# Patient Record
Sex: FEMALE | Race: WHITE
[De-identification: names, ages, dates, MRNs, and addresses within clinical notes are randomized per-mention and may not be internally consistent; named-entity substitution may affect disease eponyms.]

---

## 2017-02-14 ENCOUNTER — HOSPITAL ENCOUNTER (EMERGENCY)
Dept: HOSPITAL 26 - MED | Age: 37
Discharge: HOME | End: 2017-02-14
Payer: MEDICAID

## 2017-02-14 VITALS — WEIGHT: 240 LBS | HEIGHT: 67 IN | BODY MASS INDEX: 37.67 KG/M2

## 2017-02-14 VITALS — SYSTOLIC BLOOD PRESSURE: 124 MMHG | DIASTOLIC BLOOD PRESSURE: 64 MMHG

## 2017-02-14 VITALS — DIASTOLIC BLOOD PRESSURE: 68 MMHG | SYSTOLIC BLOOD PRESSURE: 127 MMHG

## 2017-02-14 DIAGNOSIS — R03.0: ICD-10-CM

## 2017-02-14 DIAGNOSIS — B37.3: Primary | ICD-10-CM

## 2017-02-14 DIAGNOSIS — Z88.5: ICD-10-CM

## 2017-02-14 DIAGNOSIS — R51: ICD-10-CM

## 2017-02-14 NOTE — NUR
-------------------------------------------------------------------------------

           *** Note undone in EDM - 02/14/17 at 0346 by ODALIS ***            

-------------------------------------------------------------------------------

PATIENT ELOPED FROM FACILITY. DISCHARGE INSTRUCTIONS NOT GIVEN TO PATIENT. DR. SELBY NOTIFIED.

## 2017-02-14 NOTE — NUR
TRIED TO CALL THE PHONE NUMBER ON THE PATIENT'S FACESHEET BUT IT DIRECTED ME 
RIGHT AWAY TO THE VOICEMAIL BOX. I LEFT A MESSAGE NOTIFYING THAT SHE HAS A 
PRESCRITPTION AND IF SHE CAN COME AND GET IT. CHARGE NURSE AWARE.

## 2017-02-14 NOTE — NUR
PATIENT PRESENTS TO ED WITH LEFT HEAD PRESSURE AND LEFT EAR PAIN THAT RADIATES 
TO NECK. DENIES N/V/D; SKIN IS PINK/WARM/DRY; AAOX4 WITH EVEN AND STEADY GAIT; 
LUNGS CLEAR BL; HR EVEN AND REGULAR; PT DENIES ANY FEVER, CP, SOB, OR COUGH AT 
THIS TIME; PATIENT STATES PAIN OF 10/10 AT THIS TIME; VSS; PATIENT POSITIONED 
FOR COMFORT; HOB ELEVATED; BEDRAILS UP X2; BED DOWN. ER MD MADE AWARE OF PT 
STATUS.

## 2017-02-14 NOTE — NUR
ATTEMPT TO CALL PT WITH PHONE NUMBER FROM [employer] [phone]. CALLED TWICE 
NO ANSWER.

## 2017-06-24 ENCOUNTER — HOSPITAL ENCOUNTER (INPATIENT)
Dept: HOSPITAL 26 - MED | Age: 37
LOS: 2 days | Discharge: HOME | DRG: 812 | End: 2017-06-26
Attending: FAMILY MEDICINE | Admitting: FAMILY MEDICINE
Payer: MEDICAID

## 2017-06-24 VITALS — WEIGHT: 240 LBS | HEIGHT: 69 IN | BODY MASS INDEX: 35.55 KG/M2

## 2017-06-24 VITALS — DIASTOLIC BLOOD PRESSURE: 84 MMHG | SYSTOLIC BLOOD PRESSURE: 137 MMHG

## 2017-06-24 VITALS — DIASTOLIC BLOOD PRESSURE: 51 MMHG | SYSTOLIC BLOOD PRESSURE: 102 MMHG

## 2017-06-24 VITALS — DIASTOLIC BLOOD PRESSURE: 79 MMHG | SYSTOLIC BLOOD PRESSURE: 133 MMHG

## 2017-06-24 VITALS — DIASTOLIC BLOOD PRESSURE: 58 MMHG | SYSTOLIC BLOOD PRESSURE: 103 MMHG

## 2017-06-24 DIAGNOSIS — Z88.5: ICD-10-CM

## 2017-06-24 DIAGNOSIS — G92: ICD-10-CM

## 2017-06-24 DIAGNOSIS — E11.9: ICD-10-CM

## 2017-06-24 DIAGNOSIS — D64.9: ICD-10-CM

## 2017-06-24 DIAGNOSIS — E05.80: ICD-10-CM

## 2017-06-24 DIAGNOSIS — F15.10: ICD-10-CM

## 2017-06-24 DIAGNOSIS — R74.0: ICD-10-CM

## 2017-06-24 DIAGNOSIS — F25.0: ICD-10-CM

## 2017-06-24 DIAGNOSIS — T43.622A: Primary | ICD-10-CM

## 2017-06-24 DIAGNOSIS — F17.210: ICD-10-CM

## 2017-06-24 DIAGNOSIS — Z56.0: ICD-10-CM
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
## 2017-06-24 DIAGNOSIS — E87.6: ICD-10-CM

## 2017-06-24 DIAGNOSIS — N39.0: ICD-10-CM

## 2017-06-24 DIAGNOSIS — Z72.89: ICD-10-CM

## 2017-06-24 DIAGNOSIS — N17.0: ICD-10-CM

## 2017-06-24 LAB
ALBUMIN FLD-MCNC: 3.7 G/DL (ref 3.4–5)
ALP SERPL-CCNC: 73 U/L (ref 46–116)
ALT SERPL-CCNC: 100 U/L (ref 12–78)
AMPHET UR-MCNC: (no result) NG/ML
AMYLASE SERPL-CCNC: 27 U/L (ref 25–115)
ANION GAP SERPL CALCULATED.3IONS-SCNC: 13.9 MMOL/L (ref 8–16)
APPEARANCE UR: CLEAR
APTT PPP: 25.9 SECS (ref 22–35.6)
AST SERPL-CCNC: 97 U/L (ref 15–37)
BACTERIA URNS QL MICRO: (no result) /HPF
BARBITURATES UR QL SCN: (no result) NG/ML
BASOPHILS # BLD AUTO: 0.2 K/UL (ref 0–0.22)
BASOPHILS NFR BLD AUTO: 1.2 % (ref 0–2)
BENZODIAZ UR QL SCN: (no result) NG/ML
BILIRUB DIRECT SERPL-MCNC: 0.2 MG/DL (ref 0–0.3)
BILIRUB SERPL-MCNC: 0.6 MG/DL (ref 0–1)
BILIRUB UR QL CFM: NEGATIVE
BILIRUB UR QL STRIP: (no result)
BUN SERPL-MCNC: 16 MG/DL (ref 7–18)
BZE UR QL SCN: (no result) NG/ML
CALCIUM SPEC-MCNC: 8.1 MG/DL (ref 8.5–10.1)
CANNABINOIDS UR QL SCN: (no result) NG/ML
CHLORIDE SERPL-SCNC: 103 MMOL/L (ref 98–107)
CO2 SERPL-SCNC: 25 MMOL/L (ref 21–32)
COLOR UR: YELLOW
CREAT SERPL-MCNC: 0.9 MG/DL (ref 0.6–1.3)
EOSINOPHIL # BLD AUTO: 0.4 K/UL (ref 0–0.4)
EOSINOPHIL NFR BLD AUTO: 3 % (ref 0–4)
ERYTHROCYTE [DISTWIDTH] IN BLOOD BY AUTOMATED COUNT: 13.5 % (ref 11.6–13.7)
ETHANOL SERPL-MCNC: < 3 MG/DL (ref ?–3)
GFR SERPL CREATININE-BSD FRML MDRD: 91 ML/MIN (ref 90–?)
GLUCOSE SERPL-MCNC: 99 MG/DL (ref 74–106)
GLUCOSE UR STRIP-MCNC: NEGATIVE MG/DL
HCT VFR BLD AUTO: 34.2 % (ref 36–48)
HGB BLD-MCNC: 11.7 G/DL (ref 12–16)
HGB UR QL STRIP: (no result)
INR PPP: 1.1 (ref 0.8–1.2)
LEUKOCYTE ESTERASE UR QL STRIP: NEGATIVE
LIPASE SERPL-CCNC: 115 U/L (ref 73–393)
LYMPHOCYTES # BLD AUTO: 1.8 K/UL (ref 2.5–16.5)
LYMPHOCYTES NFR BLD AUTO: 13.8 % (ref 20.5–51.1)
MAGNESIUM SERPL-MCNC: 2.1 MG/DL (ref 1.8–2.4)
MCH RBC QN AUTO: 28 PG (ref 27–31)
MCHC RBC AUTO-ENTMCNC: 34 G/DL (ref 33–37)
MCV RBC AUTO: 83 FL (ref 80–94)
MONOCYTES # BLD AUTO: 0.8 K/UL (ref 0.8–1)
MONOCYTES NFR BLD AUTO: 5.8 % (ref 1.7–9.3)
MUCOUS THREADS URNS QL MICRO: (no result) /LPF
NEUTROPHILS # BLD AUTO: 9.9 K/UL (ref 1.8–7.7)
NEUTROPHILS NFR BLD AUTO: 76.2 % (ref 42.2–75.2)
NITRITE UR QL STRIP: NEGATIVE
OPIATES UR QL SCN: (no result) NG/ML
PCP UR QL SCN: (no result) NG/ML
PH UR STRIP: 6 [PH] (ref 5–9)
PHOSPHATE SERPL-MCNC: 2.7 MG/DL (ref 2.5–4.9)
PLATELET # BLD AUTO: 255 K/UL (ref 140–450)
POTASSIUM SERPL-SCNC: 2.9 MMOL/L (ref 3.5–5.1)
PROT SERPL-MCNC: 8.6 G/DL (ref 6.4–8.2)
PROT UR QL STRIP: (no result)
PROTHROMBIN TIME: 11.3 SECS (ref 10.8–13.4)
RBC # BLD AUTO: 4.13 MIL/UL (ref 4.2–5.4)
RBC #/AREA URNS HPF: (no result) /HPF (ref 0–5)
SODIUM SERPL-SCNC: 139 MMOL/L (ref 136–145)
SP GR UR STRIP: 1.02 (ref 1–1.03)
SQUAMOUS URNS QL MICRO: (no result) /LPF
T4 FREE SERPL-MCNC: 1.6 NG/DL (ref 0.76–1.46)
TSH SERPL DL<=0.05 MIU/L-ACNC: 1.36 UIU/ML (ref 0.34–3.76)
UROBILINOGEN UR STRIP-MCNC: 0.2 EU/DL (ref 0.2–1)
WBC # BLD AUTO: 13.1 K/UL (ref 4.8–10.8)
WBC,URINE: (no result) /HPF (ref 0–5)

## 2017-06-24 PROCEDURE — G0482 DRUG TEST DEF 15-21 CLASSES: HCPCS

## 2017-06-24 PROCEDURE — C1758 CATHETER, URETERAL: HCPCS

## 2017-06-24 RX ADMIN — SODIUM CHLORIDE SCH MLS/HR: 9 INJECTION, SOLUTION INTRAVENOUS at 16:19

## 2017-06-24 SDOH — ECONOMIC STABILITY - INCOME SECURITY: UNEMPLOYMENT, UNSPECIFIED: Z56.0

## 2017-06-24 NOTE — NUR
ON LOWER EXTREMITIES HAS RED SPOTS AND ITCHING , PT SCRATCH IT ALL THE TIME DR JOHNSON INFORM 
.ORDER RECEIVED.

## 2017-06-24 NOTE — NUR
PT RESTING ON BED;CHECKED FOR CIRCULATION ON UPPER AND LOWER EXTREMITIES;NO 
COMPROMISE CIRCULATION NOTED;NO BRUISE NOTED;WILL CONTIUE TO MONITOR PT.

## 2017-06-24 NOTE — NUR
PT RESTING ON BED;CHECKED RESTRAINTS;CHECKED UPPER AND LOWER EXTREMITIES;NO 
COMPROMISE CIRCULATION NOTED;SKIN IS INTACT.

## 2017-06-24 NOTE — NUR
NOTIFIED BERNARDO PADILLA RN THAT SCHEDULED 2100 SEROQUEL 25MG PO IS NOT AVAILABLE IN ICU. CHARGE 
NURSE VALENCIA ORTIZ MADE AWARE. CONTINUE TO MONITOR PATIENT.

## 2017-06-24 NOTE — NUR
SEEN BY DR. JOHNSON , HE EXAM PT, AT BED SIDE . HE AWARE THAT PT, DID'T RECEIVE ROCEPINE FROM 
ER.. HE WILL ODER AFTER COMPLETED PT. EVALUATION.

## 2017-06-24 NOTE — NUR
RESTRAINT REMOVED;PT IS CALM AND RESTING ON BED QUIETLY;W/GOOD CIRCULATION ON 
UPPER AND LOWER EXTREMITIES;SKIN INTACT;PT ABLE TO MOVE ALL EXTREMITIES;WILL 
CONTOINUE TO MONITOR PT.

## 2017-06-24 NOTE — NUR
SEEN BY DR. MCCOY AT BED SIDE, KEEP PT. ON 5150 HOLD, SHE TRY TO GET INTOUCH WITH HER 
FAMILY FOR MORE INFORMATION.

## 2017-06-24 NOTE — NUR
TOLERATED DUE MEDICATION. NO SIGNS OF DISTRESS OR SOB NOTED. PATIENT'S NEEDS MET AT THIS 
TIME. CONTINUE TO MONITOR PATIENT.

## 2017-06-24 NOTE — NUR
RECEIVED REPORT FROM TRACI OTRIZ. PATIENT IS CURRENTLY RESTING IN BED, BUT CAN BE AROUSED BY 
NAME. OPENS EYES SPONTANEOUSLY, ABLE TO FOLLOW COMMANDS, AND MAKES NEEDS KNOWN. PATIENT IS 
AFEBRILE AND VITAL SIGNS ARE STABLE. NSR ON CARDIAC MONITOR. BOWEL SOUNDS ARE ACTIVE. THERE 
IS A #22 IN THE RIGHT AC RECEIVING NORMAL SALINE  ML/HR. SITE IS DRY, INTACT, AND 
ASYMPTOMATIC. SCDS ARE IN PLACE FOR VTE PROPHYLAXIS. EXPLAINED PLAN OF CARE TONIGHT TO 
INCLUDE MEDICATION ADMINISTRATION, VITALS, AND CARDIAC MONITORING. PATIENT VERBALIZED 
UNDERSTANDING. PATIENT'S NEEDS MET AT THIS TIME. HOB AT 30 DEGREES WITH BED IN LOW POSITION. 
WILL CONTINUE TO MONITOR PATIENT. 

-------------------------------------------------------------------------------

Addendum: 06/24/17 at 2255 by Rahul Ordoñez RN

-------------------------------------------------------------------------------

PATIENT IS TELEMETRY STATUS.

## 2017-06-24 NOTE — NUR
RECEIVED FROM ER VIA [facility]. SHE AWAKE AND ALERT ORIENTED X3 . SHE IS UNABLE TO IDENTIFY 
THE DAY AND DATE. ABLE TO WALK FROM [facility] TO BED. SHE IS ON ROOM AIR O2 98% . SKIN DRY AND 
WARM TO TOUCH COLOR IS DARK CLARK ,BREATH SOUND CLEAR  ABDOMEN SOFT B/S ACTIVE.

## 2017-06-24 NOTE — NUR
Patient will be admitted to care of DR JOSE. Admited to ICU.  Will go to room 
3. Belongings list completed.  Report to DIANA BANEGAS.

## 2017-06-24 NOTE — NUR
1021--Patient was BIBA and taken to bed 03 via gurney per EMS. [employer] at 
bedside; [employer] at bedside.

## 2017-06-25 VITALS — SYSTOLIC BLOOD PRESSURE: 107 MMHG | DIASTOLIC BLOOD PRESSURE: 60 MMHG

## 2017-06-25 VITALS — DIASTOLIC BLOOD PRESSURE: 68 MMHG | SYSTOLIC BLOOD PRESSURE: 120 MMHG

## 2017-06-25 VITALS — DIASTOLIC BLOOD PRESSURE: 62 MMHG | SYSTOLIC BLOOD PRESSURE: 109 MMHG

## 2017-06-25 VITALS — DIASTOLIC BLOOD PRESSURE: 53 MMHG | SYSTOLIC BLOOD PRESSURE: 115 MMHG

## 2017-06-25 VITALS — SYSTOLIC BLOOD PRESSURE: 106 MMHG | DIASTOLIC BLOOD PRESSURE: 69 MMHG

## 2017-06-25 VITALS — DIASTOLIC BLOOD PRESSURE: 78 MMHG | SYSTOLIC BLOOD PRESSURE: 121 MMHG

## 2017-06-25 LAB
ANION GAP SERPL CALCULATED.3IONS-SCNC: 10.7 MMOL/L (ref 8–16)
BASOPHILS # BLD AUTO: 0.2 K/UL (ref 0–0.22)
BASOPHILS NFR BLD AUTO: 1.5 % (ref 0–2)
BUN SERPL-MCNC: 7 MG/DL (ref 7–18)
CALCIUM SPEC-MCNC: 7.4 MG/DL (ref 8.5–10.1)
CHLORIDE SERPL-SCNC: 107 MMOL/L (ref 98–107)
CHOLEST SERPL-MCNC: 95 MG/DL (ref ?–200)
CHOLEST/HDLC SERPL: 3.3 {RATIO} (ref 1–4.5)
CO2 SERPL-SCNC: 26.2 MMOL/L (ref 21–32)
CREAT SERPL-MCNC: 0.7 MG/DL (ref 0.6–1.3)
EOSINOPHIL # BLD AUTO: 0.4 K/UL (ref 0–0.4)
EOSINOPHIL NFR BLD AUTO: 3.7 % (ref 0–4)
ERYTHROCYTE [DISTWIDTH] IN BLOOD BY AUTOMATED COUNT: 13.6 % (ref 11.6–13.7)
GFR SERPL CREATININE-BSD FRML MDRD: 122 ML/MIN (ref 90–?)
GLUCOSE SERPL-MCNC: 90 MG/DL (ref 74–106)
HCT VFR BLD AUTO: 33.1 % (ref 36–48)
HDLC SERPL-MCNC: 29 MG/DL (ref 40–60)
HGB BLD-MCNC: 11.1 G/DL (ref 12–16)
LDLC SERPL CALC-MCNC: 57 MG/DL (ref 60–100)
LYMPHOCYTES # BLD AUTO: 1.9 K/UL (ref 2.5–16.5)
LYMPHOCYTES NFR BLD AUTO: 19 % (ref 20.5–51.1)
MAGNESIUM SERPL-MCNC: 2 MG/DL (ref 1.8–2.4)
MCH RBC QN AUTO: 28 PG (ref 27–31)
MCHC RBC AUTO-ENTMCNC: 34 G/DL (ref 33–37)
MCV RBC AUTO: 83 FL (ref 80–94)
MONOCYTES # BLD AUTO: 0.5 K/UL (ref 0.8–1)
MONOCYTES NFR BLD AUTO: 5.2 % (ref 1.7–9.3)
NEUTROPHILS # BLD AUTO: 7 K/UL (ref 1.8–7.7)
NEUTROPHILS NFR BLD AUTO: 70.6 % (ref 42.2–75.2)
PHOSPHATE SERPL-MCNC: 2.7 MG/DL (ref 2.5–4.9)
PLATELET # BLD AUTO: 231 K/UL (ref 140–450)
POTASSIUM SERPL-SCNC: 2.9 MMOL/L (ref 3.5–5.1)
RBC # BLD AUTO: 3.99 MIL/UL (ref 4.2–5.4)
SODIUM SERPL-SCNC: 141 MMOL/L (ref 136–145)
TRIGL SERPL-MCNC: 48 MG/DL (ref 30–150)
WBC # BLD AUTO: 10 K/UL (ref 4.8–10.8)

## 2017-06-25 RX ADMIN — DEXTROSE SCH MLS/HR: 5 SOLUTION INTRAVENOUS at 21:00

## 2017-06-25 RX ADMIN — SODIUM CHLORIDE SCH MLS/HR: 9 INJECTION, SOLUTION INTRAVENOUS at 20:07

## 2017-06-25 RX ADMIN — DEXTROSE SCH MLS/HR: 5 SOLUTION INTRAVENOUS at 11:17

## 2017-06-25 RX ADMIN — SODIUM CHLORIDE SCH MLS/HR: 9 INJECTION, SOLUTION INTRAVENOUS at 00:07

## 2017-06-25 RX ADMIN — SODIUM CHLORIDE SCH MLS/HR: 9 INJECTION, SOLUTION INTRAVENOUS at 11:16

## 2017-06-25 NOTE — NUR
PATIENT RESTING COMFORTABLY IN BED WITH NO ACUTE DISTRESS OR SOB NOTED. WILL CONTINUE TO 
MONITOR PATIENT.

## 2017-06-25 NOTE — NUR
RECEIVED PT FROM FRITZ RN PT AAOX4  SLEEPY ON TELEMETRY SR HL ON RT WRIST, HL ON RT AC AND IV 
FLUID ON LEFT FA SITTER AT BED SIDE INITIAL ASSESSMENT DONE

## 2017-06-25 NOTE — NUR
PT TAKEN  HER MEDIC WELL  RESTING ON BED; WAS CLAFIFY WITH CHARGE NURSE AND SUPERVISOR  THAT 
 DR MCINTOSH SAID THAT PT DOES NOT MEET 5150 AND CAN BE DISCHARGE WHEN MEDICAL CLEAR

## 2017-06-25 NOTE — NUR
OOB to bedside commode. While out of bed, dinner served. Patient will transfer to telemetry  
when bed is available. Dr. FullerEspinal in to interview patient. Dr. Bland informed that she want 
to release the 5150, but Dr. Bland want to keep her on for one more day. Patient is aware of 
plan of care.

## 2017-06-25 NOTE — NUR
RECEIVE PATIENT SLEEPING. EASILY AROUSABLE TO HER NAME. ALERT AND ORIENTED TO NAME PLACE AND 
TIME. SHE OFFERS NOT C/O DISCOMFORT AT THIS TIME. CARDIAC MONITORING SHOWS SINUS RHYTHM. 
PERIPHARAL  PLUSE PALPABLE. NO S/S OF EDEMA

BREAKFAST TRY SET UP.

ABNORMAL LAB , LEVAR, LEVEL 2.9. DR. LIMA IS ROUNDING AT THIS TIME AND INFORMED OF 
ABNORMAL LABS. ORDERS TO GIVE K-RIDER.

## 2017-06-25 NOTE — NUR
PATIENT IS RESTING COMFORTABLY IN BED. BREATHING IS EVEN AND UNLABORED. CONTINUE TO MONITOR 
PATIENT.

## 2017-06-26 VITALS — DIASTOLIC BLOOD PRESSURE: 55 MMHG | SYSTOLIC BLOOD PRESSURE: 106 MMHG

## 2017-06-26 VITALS — SYSTOLIC BLOOD PRESSURE: 106 MMHG | DIASTOLIC BLOOD PRESSURE: 58 MMHG

## 2017-06-26 VITALS — SYSTOLIC BLOOD PRESSURE: 100 MMHG | DIASTOLIC BLOOD PRESSURE: 56 MMHG

## 2017-06-26 VITALS — SYSTOLIC BLOOD PRESSURE: 94 MMHG | DIASTOLIC BLOOD PRESSURE: 51 MMHG

## 2017-06-26 VITALS — DIASTOLIC BLOOD PRESSURE: 53 MMHG | SYSTOLIC BLOOD PRESSURE: 116 MMHG

## 2017-06-26 LAB
ANION GAP SERPL CALCULATED.3IONS-SCNC: 10.3 MMOL/L (ref 8–16)
BASOPHILS # BLD AUTO: 0.2 K/UL (ref 0–0.22)
BASOPHILS NFR BLD AUTO: 2 % (ref 0–2)
BUN SERPL-MCNC: 10 MG/DL (ref 7–18)
CALCIUM SPEC-MCNC: 7.5 MG/DL (ref 8.5–10.1)
CHLORIDE SERPL-SCNC: 108 MMOL/L (ref 98–107)
CO2 SERPL-SCNC: 26.8 MMOL/L (ref 21–32)
CREAT SERPL-MCNC: 0.7 MG/DL (ref 0.6–1.3)
EOSINOPHIL # BLD AUTO: 0.3 K/UL (ref 0–0.4)
EOSINOPHIL NFR BLD AUTO: 3.7 % (ref 0–4)
ERYTHROCYTE [DISTWIDTH] IN BLOOD BY AUTOMATED COUNT: 13.6 % (ref 11.6–13.7)
GFR SERPL CREATININE-BSD FRML MDRD: 122 ML/MIN (ref 90–?)
GLUCOSE SERPL-MCNC: 107 MG/DL (ref 74–106)
HCT VFR BLD AUTO: 33.1 % (ref 36–48)
HGB BLD-MCNC: 10.8 G/DL (ref 12–16)
LYMPHOCYTES # BLD AUTO: 2.4 K/UL (ref 2.5–16.5)
LYMPHOCYTES NFR BLD AUTO: 27 % (ref 20.5–51.1)
MCH RBC QN AUTO: 27 PG (ref 27–31)
MCHC RBC AUTO-ENTMCNC: 33 G/DL (ref 33–37)
MCV RBC AUTO: 84 FL (ref 80–94)
MONOCYTES # BLD AUTO: 0.7 K/UL (ref 0.8–1)
MONOCYTES NFR BLD AUTO: 8.3 % (ref 1.7–9.3)
NEUTROPHILS # BLD AUTO: 5.2 K/UL (ref 1.8–7.7)
NEUTROPHILS NFR BLD AUTO: 59 % (ref 42.2–75.2)
PLATELET # BLD AUTO: 218 K/UL (ref 140–450)
POTASSIUM SERPL-SCNC: 3.1 MMOL/L (ref 3.5–5.1)
RBC # BLD AUTO: 3.95 MIL/UL (ref 4.2–5.4)
SODIUM SERPL-SCNC: 142 MMOL/L (ref 136–145)
WBC # BLD AUTO: 8.8 K/UL (ref 4.8–10.8)

## 2017-06-26 RX ADMIN — SODIUM CHLORIDE SCH MLS/HR: 9 INJECTION, SOLUTION INTRAVENOUS at 09:42

## 2017-06-26 NOTE — NUR
NOT DISTRESS NOTED ON TELE SR  , VOIDING WELL IV ON RT AC INFUSING WELLCOOPERATIVE TO FOLLOW 
DR MANZANARES

## 2017-06-26 NOTE — NUR
CM NOTE

AS PER  VIN, REVIEWS SHOULD BE SENT TO [facility] AND [facility]. FAXED INITIAL REVIEW TO [facility] [phone] AND TO [employer] 
[facility] [phone]

## 2017-06-26 NOTE — NUR
PT AWAKE AND ALERT, NO SIGNS OF ACUTE DISTRESS.  BOWEL SOUNDS ACTIVE IN ALL 4 QUADRANTS, 
BOWEL AND BLADDER CONTINENCE.  SKIN INTACT WITH RASH ON BUTTOCKS JUSTIN.  AMBULATORY WITH BRP.  
IV PATENT AND ASYMPTOMATIC.  PT DENIES PAIN AT THIS TIME.  RE-ORIENTED PATIENT TO UNIT AND 
HOSPITAL.  BED IN LOW POSITION WITH BILATERAL HALF SIDE RAILS UP, CALL LIGHT WITHIN REACH.

## 2017-06-26 NOTE — NUR
PATIENT HAS BEEN SCREENED AND CATEGORIZED AS LOW NUTRITION RISK. PATIENT WILL BE SEEN WITHIN 
7 DAYS OF ADMISSION.



7/1/17



ADEN CHEEMA RD

## 2017-06-26 NOTE — NUR
PT AWAKE AND ALERT, NO SIGNS OF ACUTE DISTRESS.  EDUCATED PATIENT ON DISCHARGE INSTRUCTIONS, 
INCLUDING FOLLOW UP WITH PCP WITHIN 3-5 DAYS, NEW PRESCRIPTION, SIGNS/SYMPTOMS OF WORSENING 
CONDITION AND/OR INFECTION, AND INFORMATION ON HOMELESS SHELTER AND CARE.  PT VERBALIZED 
UNDERSTANDING.  D/C'D IV AND TELE MONITOR, CUT OFF WRIST BANDS, GAVE PATIENT A BUS PASS.  
WALKED PT OUT TO FRONT LOBBY TO D/C VIA PUBLIC BUS.

## 2018-07-29 ENCOUNTER — HOSPITAL ENCOUNTER (EMERGENCY)
Dept: HOSPITAL 26 - MED | Age: 38
Discharge: HOME | End: 2018-07-29
Payer: MEDICAID

## 2018-07-29 VITALS — DIASTOLIC BLOOD PRESSURE: 69 MMHG | SYSTOLIC BLOOD PRESSURE: 104 MMHG

## 2018-07-29 VITALS — BODY MASS INDEX: 35.36 KG/M2 | WEIGHT: 220 LBS | HEIGHT: 66 IN

## 2018-07-29 VITALS — SYSTOLIC BLOOD PRESSURE: 121 MMHG | DIASTOLIC BLOOD PRESSURE: 69 MMHG

## 2018-07-29 DIAGNOSIS — Z88.5: ICD-10-CM
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
## 2018-07-29 DIAGNOSIS — Z91.09: ICD-10-CM
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
## 2018-07-29 DIAGNOSIS — S51.811A: Primary | ICD-10-CM

## 2018-07-29 PROCEDURE — 99283 EMERGENCY DEPT VISIT LOW MDM: CPT

## 2018-07-29 PROCEDURE — 12002 RPR S/N/AX/GEN/TRNK2.6-7.5CM: CPT

## 2018-07-29 PROCEDURE — 96372 THER/PROPH/DIAG INJ SC/IM: CPT

## 2018-08-14 ENCOUNTER — HOSPITAL ENCOUNTER (EMERGENCY)
Dept: HOSPITAL 26 - MED | Age: 38
Discharge: HOME | End: 2018-08-14
Payer: MEDICAID

## 2018-08-14 VITALS — SYSTOLIC BLOOD PRESSURE: 128 MMHG | DIASTOLIC BLOOD PRESSURE: 78 MMHG

## 2018-08-14 VITALS — DIASTOLIC BLOOD PRESSURE: 79 MMHG | SYSTOLIC BLOOD PRESSURE: 128 MMHG

## 2018-08-14 VITALS — HEIGHT: 66 IN | BODY MASS INDEX: 35.36 KG/M2 | WEIGHT: 220 LBS
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
## 2018-08-14 DIAGNOSIS — S51.811D: Primary | ICD-10-CM
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
## 2019-02-17 ENCOUNTER — HOSPITAL ENCOUNTER (EMERGENCY)
Dept: HOSPITAL 26 - MED | Age: 39
Discharge: HOME | End: 2019-02-17
Payer: MEDICAID

## 2019-02-17 VITALS — BODY MASS INDEX: 36.96 KG/M2 | HEIGHT: 67 IN | WEIGHT: 235.5 LBS

## 2019-02-17 VITALS — DIASTOLIC BLOOD PRESSURE: 79 MMHG | SYSTOLIC BLOOD PRESSURE: 120 MMHG

## 2019-02-17 VITALS — DIASTOLIC BLOOD PRESSURE: 77 MMHG | SYSTOLIC BLOOD PRESSURE: 119 MMHG

## 2019-02-17 DIAGNOSIS — Z88.5: ICD-10-CM

## 2019-02-17 DIAGNOSIS — N76.0: Primary | ICD-10-CM

## 2019-02-17 DIAGNOSIS — Z79.899: ICD-10-CM

## 2019-02-17 PROCEDURE — 87210 SMEAR WET MOUNT SALINE/INK: CPT

## 2019-02-17 PROCEDURE — 96372 THER/PROPH/DIAG INJ SC/IM: CPT

## 2019-02-17 PROCEDURE — 99283 EMERGENCY DEPT VISIT LOW MDM: CPT

## 2019-02-17 PROCEDURE — 36415 COLL VENOUS BLD VENIPUNCTURE: CPT

## 2019-02-17 PROCEDURE — 87491 CHLMYD TRACH DNA AMP PROBE: CPT

## 2019-02-17 PROCEDURE — 81025 URINE PREGNANCY TEST: CPT

## 2019-02-17 PROCEDURE — 81002 URINALYSIS NONAUTO W/O SCOPE: CPT

## 2019-02-17 NOTE — NUR
Patient discharged with v/s stable. Written and verbal after care instructions 
given and explained. Patient alert, oriented and verbalized understanding of 
instructions. Ambulatory with steady gait. All questions addressed prior to 
discharge. ID band removed. Patient advised to follow up with PMD. Rx of 
doxycycline given. Patient educated on indication of medication including 
possible reaction and side effects. Opportunity to ask questions provided and 
answered.

## 2019-02-17 NOTE — NUR
38Y/F BIB SELF WITH C/O NON RADIATING LOWER MID ABDOMINAL PAIN WITH PAINFUL 
URINATION X 1 WK. PER PT S/S SIMILAR WHEN SHE WAS TREATED WITH SYPHILIS LAST 
DECEMBER. LBM; YESTERDAY, - BLODDY STOOL, BS ACTIVE X 4 QUAD, SOFT AND TENDER. 
PT IS AAOX4, VSS AT THIS TIME, BED DOWN, LOW, LOCKED, BEDRAIL UP X 1, ER MD 
AWARE AND NOTIFIED OF PT STATUS. 



HX; DENIES

RX; DENIES

## 2019-03-24 ENCOUNTER — HOSPITAL ENCOUNTER (EMERGENCY)
Dept: HOSPITAL 26 - MED | Age: 39
Discharge: HOME | End: 2019-03-24
Payer: MEDICAID

## 2019-03-24 VITALS — DIASTOLIC BLOOD PRESSURE: 61 MMHG | SYSTOLIC BLOOD PRESSURE: 115 MMHG

## 2019-03-24 VITALS — BODY MASS INDEX: 38.5 KG/M2 | WEIGHT: 245.31 LBS | HEIGHT: 67 IN

## 2019-03-24 VITALS — SYSTOLIC BLOOD PRESSURE: 113 MMHG | DIASTOLIC BLOOD PRESSURE: 70 MMHG

## 2019-03-24 DIAGNOSIS — Z88.5: ICD-10-CM

## 2019-03-24 DIAGNOSIS — D25.9: Primary | ICD-10-CM

## 2019-03-24 LAB
ALBUMIN FLD-MCNC: 3.3 G/DL (ref 3.4–5)
AMORPH SED URNS QL MICRO: (no result) /HPF
ANION GAP SERPL CALCULATED.3IONS-SCNC: 13.5 MMOL/L (ref 8–16)
APPEARANCE UR: (no result)
AST SERPL-CCNC: 32 U/L (ref 15–37)
BASOPHILS # BLD AUTO: 0 K/UL (ref 0–0.22)
BASOPHILS NFR BLD AUTO: 0.3 % (ref 0–2)
BILIRUB SERPL-MCNC: 0.3 MG/DL (ref 0–1)
BILIRUB UR QL STRIP: NEGATIVE
BUN SERPL-MCNC: 8 MG/DL (ref 7–18)
CHLORIDE SERPL-SCNC: 106 MMOL/L (ref 98–107)
CO2 SERPL-SCNC: 26.5 MMOL/L (ref 21–32)
COLOR UR: YELLOW
CREAT SERPL-MCNC: 0.8 MG/DL (ref 0.6–1.3)
EOSINOPHIL # BLD AUTO: 0.1 K/UL (ref 0–0.4)
EOSINOPHIL NFR BLD AUTO: 1.4 % (ref 0–4)
ERYTHROCYTE [DISTWIDTH] IN BLOOD BY AUTOMATED COUNT: 13.5 % (ref 11.6–13.7)
GFR SERPL CREATININE-BSD FRML MDRD: 103 ML/MIN (ref 90–?)
GLUCOSE SERPL-MCNC: 93 MG/DL (ref 74–106)
GLUCOSE UR STRIP-MCNC: NEGATIVE MG/DL
HCT VFR BLD AUTO: 38.9 % (ref 36–48)
HGB BLD-MCNC: 13.3 G/DL (ref 12–16)
HGB UR QL STRIP: (no result)
LEUKOCYTE ESTERASE UR QL STRIP: NEGATIVE
LYMPHOCYTES # BLD AUTO: 2.3 K/UL (ref 2.5–16.5)
LYMPHOCYTES NFR BLD AUTO: 29.5 % (ref 20.5–51.1)
MCH RBC QN AUTO: 29 PG (ref 27–31)
MCHC RBC AUTO-ENTMCNC: 34 G/DL (ref 33–37)
MCV RBC AUTO: 85 FL (ref 80–94)
MONOCYTES # BLD AUTO: 0.4 K/UL (ref 0.8–1)
MONOCYTES NFR BLD AUTO: 4.7 % (ref 1.7–9.3)
NEUTROPHILS # BLD AUTO: 4.9 K/UL (ref 1.8–7.7)
NEUTROPHILS NFR BLD AUTO: 64.1 % (ref 42.2–75.2)
NITRITE UR QL STRIP: NEGATIVE
PH UR STRIP: 6 [PH] (ref 5–9)
PLATELET # BLD AUTO: 240 K/UL (ref 140–450)
POTASSIUM SERPL-SCNC: 4 MMOL/L (ref 3.5–5.1)
PROTHROMBIN TIME: 10.4 SECS (ref 10.8–13.4)
RBC # BLD AUTO: 4.58 MIL/UL (ref 4.2–5.4)
RBC #/AREA URNS HPF: (no result) /HPF (ref 0–5)
SODIUM SERPL-SCNC: 142 MMOL/L (ref 136–145)
WBC # BLD AUTO: 7.7 K/UL (ref 4.8–10.8)
WBC,URINE: (no result) /HPF (ref 0–5)

## 2019-03-24 PROCEDURE — 87210 SMEAR WET MOUNT SALINE/INK: CPT

## 2019-03-24 PROCEDURE — 99284 EMERGENCY DEPT VISIT MOD MDM: CPT

## 2019-03-24 PROCEDURE — 80053 COMPREHEN METABOLIC PANEL: CPT

## 2019-03-24 PROCEDURE — 85610 PROTHROMBIN TIME: CPT

## 2019-03-24 PROCEDURE — 76856 US EXAM PELVIC COMPLETE: CPT

## 2019-03-24 PROCEDURE — 36415 COLL VENOUS BLD VENIPUNCTURE: CPT

## 2019-03-24 PROCEDURE — 81001 URINALYSIS AUTO W/SCOPE: CPT

## 2019-03-24 PROCEDURE — 85025 COMPLETE CBC W/AUTO DIFF WBC: CPT

## 2019-03-24 PROCEDURE — 85730 THROMBOPLASTIN TIME PARTIAL: CPT

## 2019-03-24 PROCEDURE — 81025 URINE PREGNANCY TEST: CPT

## 2019-03-24 NOTE — NUR
38 Y F BIB SELF C/O FLANK PAIN AND VAGINAL DISCHARGE SINCE [place]. PAIN 7/10. 
PT STATES SHE HAS VAGINAL ITCHING, THICK WHITE DISCHARGE, AND FOUL ODOR. PT 
ALSO REPORTS A MISSED PERIOD FOR 6 MONTHS. URINE COLLECTED. BED IS DOWN, 
LOCKED, BED RAIL X 1, ERMD NOTIFIED.

PMH-NONE

MEDS-NONE

## 2019-05-18 NOTE — NUR
Patient discharged with v/s stable. Written and verbal after care instructions 
given and explained. 

Patient alert, oriented and verbalized understanding of instructions. 
Ambulatory with steady gait. All questions addressed prior to discharge. ID 
band removed. Patient advised to follow up with PMD. Rx of TRAMODOL 
HYDROCHLORIDE, MOTRIN given. Patient educated on indication of medication 
including possible reaction and side effects. Opportunity to ask questions 
provided and answered. PT GIVEN AN EXCUSE FOR WORK THROUGH MONDAY. PT GIVEN A 
REFERRAL TO [facility] AND COPY OF CT RESULTS.

## 2019-08-11 NOTE — NUR
PATIENT EXAMINED BY DR. WOODARD. PATIENT MEDICALLY CLEARED AND RELEASED IN 
CUSTODY IN STABLE CONDITION. ORIGINAL PRE-BOOK FORM GIVEN TO RICHARD CAPONE.

## 2019-11-26 NOTE — NUR
PATIENT BIB [place] [employer]. PATIENT EXAMINED BY DR. WOODARD. PATIENT 
MEDICALLY CLEARED AND RELEASED IN CUSTODY IN STABLE CONDITION. ORIGINAL 
PRE-BOOK FORM GIVEN TO OFFICER JOVITA SERRANO 400.

## 2020-08-17 NOTE — NUR
Patient given written and verbal discharge instructions and verbalizes 
understanding.  Given copies of tests performed during visit.  Patient is 
awake, alert and oriented.  Ambulatory with steady gait.  offerED LIST of 
shelter placementS.  Given list of available shelters in surrounding areas.

## 2020-08-17 NOTE — NUR
Patient discharged with v/s stable. Written and verbal after care instructions 
given and explained. 

Patient alert, oriented and verbalized understanding of instructions. 
Ambulatory with CRUTCHES AND steady gait. All questions addressed prior to 
discharge. ID band removed. Patient advised to follow up with PMD. Rx of 
IBUPROFEN given. Patient educated on indication of medication including 
possible reaction and side effects. Opportunity to ask questions provided and 
answered.

## 2020-08-17 NOTE — NUR
PT GIVEN INSTRUCTION ON PROPER USE OF CRUTCHES. FITTED TO PT HEIGHT AND ARM 
LENGTH. PT GIVEN INSTRUCTION ON WALKING WITH CRUTCHES, DEMONSTRATED SAFE USE 
FOR APPROXIMATELY 40 FEET, PT STATED SHE FELT SAFE WITH APPLICATION.
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
## 2020-10-10 ENCOUNTER — HOSPITAL ENCOUNTER (EMERGENCY)
Dept: HOSPITAL 4 - SED | Age: 40
Discharge: HOME | End: 2020-10-10
Payer: MEDICAID

## 2020-10-10 VITALS — WEIGHT: 240 LBS | BODY MASS INDEX: 38.57 KG/M2 | HEIGHT: 66 IN

## 2020-10-10 VITALS — SYSTOLIC BLOOD PRESSURE: 112 MMHG
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
## 2020-10-10 DIAGNOSIS — Z88.5: ICD-10-CM

## 2020-10-10 DIAGNOSIS — Z90.49: ICD-10-CM
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
## 2020-10-10 DIAGNOSIS — S30.0XXA: Primary | ICD-10-CM
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
## 2020-10-10 DIAGNOSIS — M25.532: ICD-10-CM
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
## 2020-10-10 PROCEDURE — 73110 X-RAY EXAM OF WRIST: CPT

## 2020-10-10 PROCEDURE — 96372 THER/PROPH/DIAG INJ SC/IM: CPT

## 2020-10-10 PROCEDURE — 99284 EMERGENCY DEPT VISIT MOD MDM: CPT

## 2020-10-10 PROCEDURE — 72170 X-RAY EXAM OF PELVIS: CPT

## 2023-04-07 ENCOUNTER — HOSPITAL ENCOUNTER (EMERGENCY)
Dept: HOSPITAL 87 - ER | Age: 43
Discharge: LEFT BEFORE DISCHARGE | End: 2023-04-07
Payer: MEDICAID

## 2023-04-07 VITALS — HEIGHT: 62 IN | WEIGHT: 191.8 LBS | BODY MASS INDEX: 35.3 KG/M2

## 2023-04-07 VITALS — DIASTOLIC BLOOD PRESSURE: 72 MMHG | SYSTOLIC BLOOD PRESSURE: 110 MMHG

## 2023-04-07 DIAGNOSIS — R41.82: Primary | ICD-10-CM

## 2023-04-07 LAB
BASOPHILS NFR BLD AUTO: 0.5 % (ref 0–2)
CHLORIDE SERPL-SCNC: 108 MEQ/L (ref 98–107)
EOSINOPHIL NFR BLD AUTO: 1 % (ref 0–5)
ERYTHROCYTE [DISTWIDTH] IN BLOOD BY AUTOMATED COUNT: 13.6 % (ref 11.6–14.6)
ETHANOL SERPL-MCNC: < 10 MG/DL
HCT VFR BLD AUTO: 36.1 % (ref 36–48)
HGB BLD-MCNC: 12.4 G/DL (ref 12–16)
LYMPHOCYTES NFR BLD AUTO: 17.2 % (ref 20–50)
MCH RBC QN AUTO: 29.7 PG (ref 28–32)
MCV RBC AUTO: 86.4 FL (ref 81–99)
MONOCYTES NFR BLD AUTO: 6.7 % (ref 2–8)
NEUTROPHILS NFR BLD AUTO: 74.6 % (ref 40–76)
PLATELET # BLD AUTO: 276 X1000/UL (ref 130–400)
PMV BLD AUTO: 9.1 FL (ref 7.4–10.4)
RBC # BLD AUTO: 4.18 MILL/UL (ref 4.2–5.4)

## 2023-04-07 PROCEDURE — 80307 DRUG TEST PRSMV CHEM ANLYZR: CPT

## 2023-04-07 PROCEDURE — 36415 COLL VENOUS BLD VENIPUNCTURE: CPT

## 2023-04-07 PROCEDURE — 80329 ANALGESICS NON-OPIOID 1 OR 2: CPT

## 2023-04-07 PROCEDURE — G0480 DRUG TEST DEF 1-7 CLASSES: HCPCS

## 2023-04-07 PROCEDURE — 80048 BASIC METABOLIC PNL TOTAL CA: CPT

## 2023-04-07 PROCEDURE — 85025 COMPLETE CBC W/AUTO DIFF WBC: CPT

## 2023-04-07 PROCEDURE — 80320 DRUG SCREEN QUANTALCOHOLS: CPT

## 2023-04-07 PROCEDURE — 99283 EMERGENCY DEPT VISIT LOW MDM: CPT

## 2024-10-30 NOTE — NUR
38 Y FEMALE C/O ABDOMINAL PAIN TO LUQ ALSO REPORTING R ARM TINGLING X 1 DAY. 
DENIES N/V OR FEVER. PAIN 10/10. PT STATES SOME DAYS DAYS SHE EXPERIENCES 
DIARRHEA AND OTHER DAYS SHE HAS CONSTIPATION. STATES SHE ATE HALF A SLICE OF 
PIZZA TODAY AND FELT DIZZY AFTERWARDS. STATES SHE HAD DYSURIA ON MOTHERS DAY 
BUT DENIES AT THIS TIME. VSS. PT IS ALERT AND ORIENTED X 4. BED IS DOWN, 
LOCKED, BED RAIL X 1, ERMD TO SEE PT.

PMH- GALLBLADDER REMOVAL, PTSD Called pt to make her a new pt apt, lvm to call us back.